# Patient Record
Sex: MALE | Race: WHITE | NOT HISPANIC OR LATINO | Employment: PART TIME | URBAN - METROPOLITAN AREA
[De-identification: names, ages, dates, MRNs, and addresses within clinical notes are randomized per-mention and may not be internally consistent; named-entity substitution may affect disease eponyms.]

---

## 2021-07-30 ENCOUNTER — TRANSFERRED RECORDS (OUTPATIENT)
Dept: HEALTH INFORMATION MANAGEMENT | Facility: CLINIC | Age: 26
End: 2021-07-30

## 2021-07-30 ENCOUNTER — TELEPHONE (OUTPATIENT)
Dept: BEHAVIORAL HEALTH | Facility: CLINIC | Age: 26
End: 2021-07-30

## 2021-07-30 LAB
ALT SERPL-CCNC: 30 IU/L (ref 6–40)
AST SERPL-CCNC: 20 IU/L (ref 10–40)
CREATININE (EXTERNAL): 1 MG/DL (ref 0.7–1.2)
GFR ESTIMATED (EXTERNAL): >60 ML/MIN/1.73M2
GLUCOSE (EXTERNAL): 115 MG/DL (ref 70–99)
POTASSIUM (EXTERNAL): 3.5 MEQ/L (ref 3.4–5.1)

## 2021-07-31 ENCOUNTER — HOSPITAL ENCOUNTER (INPATIENT)
Facility: HOSPITAL | Age: 26
LOS: 3 days | Discharge: HOME | End: 2021-08-03
Attending: PSYCHIATRY & NEUROLOGY | Admitting: PSYCHIATRY & NEUROLOGY
Payer: MEDICAID

## 2021-07-31 DIAGNOSIS — F10.20 ALCOHOL USE DISORDER, MODERATE, IN CONTROLLED ENVIRONMENT (H): Primary | ICD-10-CM

## 2021-07-31 PROBLEM — R45.851 SUICIDAL IDEATION: Status: ACTIVE | Noted: 2021-07-31

## 2021-07-31 PROCEDURE — 124N000001 HC R&B MH

## 2021-07-31 PROCEDURE — 250N000013 HC RX MED GY IP 250 OP 250 PS 637: Performed by: NURSE PRACTITIONER

## 2021-07-31 PROCEDURE — 99223 1ST HOSP IP/OBS HIGH 75: CPT | Performed by: NURSE PRACTITIONER

## 2021-07-31 RX ORDER — TRAZODONE HYDROCHLORIDE 50 MG/1
50 TABLET, FILM COATED ORAL
Status: DISCONTINUED | OUTPATIENT
Start: 2021-07-31 | End: 2021-08-03 | Stop reason: HOSPADM

## 2021-07-31 RX ORDER — NALTREXONE HYDROCHLORIDE 50 MG/1
50 TABLET, FILM COATED ORAL DAILY
Status: DISCONTINUED | OUTPATIENT
Start: 2021-08-02 | End: 2021-08-03 | Stop reason: HOSPADM

## 2021-07-31 RX ORDER — IBUPROFEN 200 MG
200-400 TABLET ORAL EVERY 6 HOURS PRN
Status: DISCONTINUED | OUTPATIENT
Start: 2021-07-31 | End: 2021-08-03 | Stop reason: HOSPADM

## 2021-07-31 RX ORDER — DIAZEPAM 5 MG
10 TABLET ORAL EVERY 30 MIN PRN
Status: DISCONTINUED | OUTPATIENT
Start: 2021-07-31 | End: 2021-08-02

## 2021-07-31 RX ORDER — CLONIDINE HYDROCHLORIDE 0.1 MG/1
0.1 TABLET ORAL 2 TIMES DAILY PRN
Status: DISCONTINUED | OUTPATIENT
Start: 2021-07-31 | End: 2021-08-02

## 2021-07-31 RX ORDER — MAGNESIUM HYDROXIDE/ALUMINUM HYDROXICE/SIMETHICONE 120; 1200; 1200 MG/30ML; MG/30ML; MG/30ML
30 SUSPENSION ORAL EVERY 4 HOURS PRN
Status: DISCONTINUED | OUTPATIENT
Start: 2021-07-31 | End: 2021-08-03 | Stop reason: HOSPADM

## 2021-07-31 RX ORDER — HYDROXYZINE HYDROCHLORIDE 25 MG/1
25-50 TABLET, FILM COATED ORAL EVERY 4 HOURS PRN
Status: DISCONTINUED | OUTPATIENT
Start: 2021-07-31 | End: 2021-08-03 | Stop reason: HOSPADM

## 2021-07-31 RX ORDER — OLANZAPINE 5 MG/1
5-10 TABLET ORAL 3 TIMES DAILY PRN
Status: DISCONTINUED | OUTPATIENT
Start: 2021-07-31 | End: 2021-08-03 | Stop reason: HOSPADM

## 2021-07-31 RX ORDER — ACETAMINOPHEN 325 MG/1
650 TABLET ORAL EVERY 4 HOURS PRN
Status: DISCONTINUED | OUTPATIENT
Start: 2021-07-31 | End: 2021-08-03 | Stop reason: HOSPADM

## 2021-07-31 RX ADMIN — NALTREXONE HYDROCHLORIDE 25 MG: 50 TABLET, FILM COATED ORAL at 12:50

## 2021-07-31 RX ADMIN — CLONIDINE HYDROCHLORIDE 0.1 MG: 0.1 TABLET ORAL at 02:39

## 2021-07-31 ASSESSMENT — ACTIVITIES OF DAILY LIVING (ADL)
CONCENTRATING,_REMEMBERING_OR_MAKING_DECISIONS_DIFFICULTY: YES
FALL_HISTORY_WITHIN_LAST_SIX_MONTHS: NO
DIFFICULTY_EATING/SWALLOWING: NO
VISION_MANAGEMENT: USE OF GLASSES
ORAL_HYGIENE: INDEPENDENT
TOILETING_ISSUES: NO
HEARING_DIFFICULTY_OR_DEAF: NO
DOING_ERRANDS_INDEPENDENTLY_DIFFICULTY: NO
DRESS: SCRUBS (BEHAVIORAL HEALTH)
ORAL_HYGIENE: INDEPENDENT
DRESSING/BATHING_DIFFICULTY: NO
HEARING_DIFFICULTY_OR_DEAF: NO
PATIENT_/_FAMILY_COMMUNICATION_STYLE: SPOKEN LANGUAGE (ENGLISH OR BILINGUAL)
ADL_ASSESSMENT: WDL
DIFFICULTY_EATING/SWALLOWING: NO
WALKING_OR_CLIMBING_STAIRS_DIFFICULTY: NO
TOILETING_ISSUES: NO
HYGIENE/GROOMING: INDEPENDENT
CONCENTRATING,_REMEMBERING_OR_MAKING_DECISIONS_DIFFICULTY: YES
HYGIENE/GROOMING: INDEPENDENT
LAUNDRY: UNABLE TO COMPLETE
DIFFICULTY_COMMUNICATING: NO
WEAR_GLASSES_OR_BLIND: YES
DRESSING/BATHING_DIFFICULTY: NO
WEAR_GLASSES_OR_BLIND: YES
WALKING_OR_CLIMBING_STAIRS_DIFFICULTY: NO

## 2021-07-31 ASSESSMENT — LIFESTYLE VARIABLES
HOW OFTEN DO YOU HAVE A DRINK CONTAINING ALCOHOL: PATIENT DECLINED
HOW MANY STANDARD DRINKS CONTAINING ALCOHOL DO YOU HAVE ON A TYPICAL DAY: PATIENT DECLINED
HOW OFTEN DO YOU HAVE SIX OR MORE DRINKS ON ONE OCCASION: PATIENT DECLINED

## 2021-07-31 ASSESSMENT — MIFFLIN-ST. JEOR: SCORE: 1797.76

## 2021-07-31 NOTE — TELEPHONE ENCOUNTER
S: Thais [phone], [facility] ED, 26/M, SI     B: Hx of dep   Pt reports active SI   Pt reports attempting an overdose with alcohol use   Family reports the pt MH has been declining recently     Medically cleared, eating, drinking, ambulating indep  Patient cleared and ready for behavioral bed placement: Yes   covid neg  UDS pos for opiates    A: Voluntary     R: HI/Chase     719pm - Intake left VM for Anamika, on call provider   728pm - Anamika accepts, would like to know an ETA on when the ED can get transportation set up due to hx of transport issues w this ED   Pt placed in queue   Clinical faxed to the unit   733pm - unit charge notified, 745pm for report   743pm - ED notified

## 2021-07-31 NOTE — PLAN OF CARE
Problem: Behavioral Health Plan of Care  Goal: Patient-Specific Goal (Individualization)  Description: Pt will be complaint with treatment team recommendation.   Pt will sleep 6-8 hours per night.   Pt will eat 50 % of meals.   Pt will attend group     Outcome: Improving     Problem: Suicidal Behavior  Goal: Suicidal Behavior is Absent or Managed  Outcome: Improving   Face to face shift report received from sandra michael.  Rounding completed, pt observed.  Pt pleasant, polite, cooperative.  Full affect, good eye contact.  Denies SI or any plan to harm himself.  Denies pain and CIWA scores all zero.    Pt states he is very tired.  Has been sleeping for most of the shift.  Did come out to open unit for dinner and snack. When on the unit for nourishment, interaction with peers was appropriate.   Encouraged pt to let staff know of any needs and he agreed.    Face to face report will be communicated to oncoming RN.    Tracie Whittaker RN  7/31/2021  9:20 PM

## 2021-07-31 NOTE — PROGRESS NOTES
07/31/21 0152   Patient Belongings   Did you bring any home meds/supplements to the hospital?  No   Patient Belongings locker;sent to security per site process   Patient Belongings Put in Hospital Secure Location (Security or Locker, etc.) watch;shoes;clothing   Belongings Search Yes   Clothing Search Yes   Second Staff Lissy   Comment water pitcher, boxers, tan cargo shorts, black nike shoes, blue tshirt, socks, headphones, 2 lighters, cigarettes, face mask, belt   List items sent to safe:  Smart watch-blue band.    All other belongings put in assigned cubby in belongings room.     I have reviewed my belongings list on admission and verify that it is correct.     Patient signature_______________________________    Second staff witness (if patient unable to sign) ______________________________       I have received all my belongings at discharge.    Patient signature________________________________    Jeanna  7/31/2021  1:54 AM

## 2021-07-31 NOTE — PLAN OF CARE
"    ADMISSION NOTE    Reason for admission suicidal ideation with possible suicide attempt per ED notes.  Safety concerns fall risk secondary to intoxification.  Risk for or history of violence none known.   Full skin assessment: intact with the exception of his right antecubital area will likely bruise (lab draw), and left top hand (IV removed) left, antecubital and forearm (lab) .     Patient arrived on unit from [facility]  accompanied by security  on 7/31/2021  0130 AM.   Status on arrival: ambulating independently.   /84   Pulse 75   Temp (!) 96.2  F (35.7  C) (Temporal)   Resp 18   Ht 1.854 m (6' 1\")   Wt 75.4 kg (166 lb 3.2 oz)   SpO2 95%   BMI 21.93 kg/m    Patient given tour of unit and Welcome to  unit papers given to patient, wanding completed, belongings inventoried, and admission assessment completed.   Patient's legal status on arrival is volentary. Appropriate legal rights discussed with and copy given to patient. Patient Bill of Rights discussed with and copy given to patient.   Patient denies SI, HI, and thoughts of self harm and contracts for safety while on unit.      Talisha Mccain RN  7/31/2021      Patient states he is going on his second DWI and works at [employer] . Unclear if he drives for them or not. He presents highly confused and possibly still intoxicated. States he never was suicidal and remembers possibly drinking Alfred Fitch. States he has been sad and worried about his upcoming court for his second DWI. States he is scared his family support will be lost. Unclear if the patient is currently intoxicated, or has an impaired cognition generally. He states confusion to what brought him here, who his primary care doctor is, if he takes medications daily, what he does for a living. Patient requires repeated reminders and cues to lay down, (after he states he wants to) that he has a blanket available, which direction the nursing station is " in. Statement from nursing report that patients mother thinks he is on the spectrum. Patient states he graduated high school and attended college for Law Enforcement. Says he lost motivation to actually work in that field. Patient denies drinking frequently, but he is pending a second DWI charge per pt. DUSTIN for  family support persons signed and listed in the patients chart.
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
## 2021-07-31 NOTE — PLAN OF CARE
"  Problem: Behavioral Health Plan of Care  Goal: Patient-Specific Goal (Individualization)  Description: Pt will be complaint with treatment team recommendation.   Pt will sleep 6-8 hours per night.   Pt will eat 50 % of meals.   Pt will attend group     7/31/2021 1216 by Kyra Kim, RN  Outcome: Improving  Note:        Problem: Suicidal Behavior  Goal: Suicidal Behavior is Absent or Managed  Outcome: Improving   Pt. Awake, alert and cooperative with assessments and cares, compliant with treatment team recommendations, slept 3 hours last night, plus has been napping this morning, eating lunch at this time, has not attended group therapy yet this shift, denies suicidal ideation, states \"I've never been suicidal\", denies depression, anxiety, hallucinations, and any alcohol withdrawal symptoms, has scored 0 on the CIWA scale so far this shift, cooperative and polite with assessment questions, tearful at one point during interview, stating \"I don't want to drink anymore\", responses are slightly delayed during conversation, will continue to monitor.  1300-  NP placed pt. On a hold, hold paper and 72 hour hold rights given to pt.  1430-  Pt. Attended partial group, left and went back to bed.      Face to face end of shift report will be communicated to oncoming afternoon shift RN.     Kyra Kim RN  7/31/2021  12:35 PM        " [General Appearance - Well Developed] : well developed [General Appearance - Well Nourished] : well nourished [Normal Appearance] : normal appearance [Well Groomed] : well groomed [General Appearance - In No Acute Distress] : no acute distress [Abdomen Soft] : soft [Scrotum] : the scrotum was normal [Urethral Meatus] : meatus normal [Urinary Bladder Findings] : the bladder was normal on palpation [Testes Mass (___cm)] : there were no testicular masses [Skin Turgor] : supple [Affect] : the affect was normal [Not Anxious] : not anxious [Oriented To Time, Place, And Person] : oriented to person, place, and time [Mood] : the mood was normal [Normal Station and Gait] : the gait and station were normal for the patient's age

## 2021-08-01 LAB — TSH SERPL DL<=0.005 MIU/L-ACNC: 1.1 MU/L (ref 0.4–4)

## 2021-08-01 PROCEDURE — 250N000013 HC RX MED GY IP 250 OP 250 PS 637: Performed by: NURSE PRACTITIONER

## 2021-08-01 PROCEDURE — 36415 COLL VENOUS BLD VENIPUNCTURE: CPT | Performed by: NURSE PRACTITIONER

## 2021-08-01 PROCEDURE — 124N000001 HC R&B MH

## 2021-08-01 PROCEDURE — 82306 VITAMIN D 25 HYDROXY: CPT | Performed by: NURSE PRACTITIONER

## 2021-08-01 PROCEDURE — 84443 ASSAY THYROID STIM HORMONE: CPT | Performed by: NURSE PRACTITIONER

## 2021-08-01 PROCEDURE — 99231 SBSQ HOSP IP/OBS SF/LOW 25: CPT | Performed by: NURSE PRACTITIONER

## 2021-08-01 RX ADMIN — NALTREXONE HYDROCHLORIDE 25 MG: 50 TABLET, FILM COATED ORAL at 08:20

## 2021-08-01 ASSESSMENT — ACTIVITIES OF DAILY LIVING (ADL)
ORAL_HYGIENE: INDEPENDENT
ORAL_HYGIENE: INDEPENDENT
LAUNDRY: UNABLE TO COMPLETE
HYGIENE/GROOMING: INDEPENDENT
HYGIENE/GROOMING: INDEPENDENT
DRESS: SCRUBS (BEHAVIORAL HEALTH)
DRESS: SCRUBS (BEHAVIORAL HEALTH)

## 2021-08-01 NOTE — PLAN OF CARE
Problem: Behavioral Health Plan of Care  Goal: Patient-Specific Goal (Individualization)  Description: Pt will be complaint with treatment team recommendation.   Pt will sleep 6-8 hours per night.   Pt will eat 50 % of meals.   Pt will attend group     Outcome: Improving  Note:        Problem: Suicidal Behavior  Goal: Suicidal Behavior is Absent or Managed  Outcome: Improving     Problem: Alcohol Withdrawal  Goal: Alcohol Withdrawal Symptom Control  Outcome: Improving   Pt. Was up and ate breakfast in dayroom, appetite is good, went back to bed after eating, slept 7 hours last night, denies suicidal ideation, depression and anxiety, denies hallucinations and pain, scored a 0 on the CIWA scale, not showing any symptoms of alcohol withdrawal, cooperative with cares, encouraged to attend group therapy sessions, will continue to monitor.    Face to face end of shift report will be communicated to oncoming afternoon shift RN.     Kyra Kim RN  8/1/2021  10:40 AM

## 2021-08-01 NOTE — PROGRESS NOTES
"[facility]  Psychiatric Progress Note     Impression     Barney Umana is a 26 year old  male who was brought to the [facility] emergency room by \" I think my grandpa\".  He does not recall a lot of the events that took place the day of his admission due to his alcohol intoxication.  His mother had reported to the emergency room that his depression had been increasing lately and believes that an upcoming court date has increased his depression even more.  His family reported they believed he had possibly overdosed and was using alcohol as a means to end his life.  When he was assessed in the emergency room he did state that he was having suicidal ideation.      I found Barney bedresting in his room. He declined to discuss any mental health issues with me at this time. Will try to meet with patient again tomorrow.       Educated regarding medication indications, risks, benefits, side effects, contraindications and possible interactions. Verbally expressed understanding.      Diagnoses     Adjustment disorder with depression and anxiety  Rule out major depressive disorder, mild  Alcohol use disorder moderate  Pervasive developmental disorder, diagnosed age 7    Attestation:  Patient has been seen and evaluated by me,  MARCELA Benjamin CNP       Medications     I have reviewed this patient's current medications  Current Facility-Administered Medications Ordered in Epic   Medication Dose Route Frequency Last Rate Last Admin     acetaminophen (TYLENOL) tablet 650 mg  650 mg Oral Q4H PRN         alum & mag hydroxide-simethicone (MAALOX) suspension 30 mL  30 mL Oral Q4H PRN         cloNIDine (CATAPRES) tablet 0.1 mg  0.1 mg Oral BID PRN   0.1 mg at 07/31/21 0239     diazepam (VALIUM) tablet 10 mg  10 mg Oral Q30 Min PRN         hydrOXYzine (ATARAX) tablet 25-50 mg  25-50 mg Oral Q4H PRN         ibuprofen (ADVIL/MOTRIN) tablet 200-400 mg  200-400 mg Oral Q6H PRN         melatonin 3 mg (with vit " "B6 10 mg) extended release tablet 1-2 tablet  1-2 tablet Oral At Bedtime PRN         [START ON 8/2/2021] naltrexone (DEPADE/REVIA) tablet 50 mg  50 mg Oral Daily         nicotine (NICORETTE) gum 2-4 mg  2-4 mg Buccal Q1H PRN         OLANZapine (zyPREXA) tablet 5-10 mg  5-10 mg Oral TID PRN         traZODone (DESYREL) tablet 50 mg  50 mg Oral At Bedtime PRN         No current Epic-ordered outpatient medications on file.        10 point ROS - denies new concerns     Allergies     No Known Allergies     Psychiatric Examination     /63   Pulse 80   Temp 97.1  F (36.2  C) (Temporal)   Resp 14   Ht 1.854 m (6' 1\")   Wt 75.4 kg (166 lb 3.2 oz)   SpO2 99%   BMI 21.93 kg/m    Weight is 166 lbs 3.2 oz  Body mass index is 21.93 kg/m .    -Appearance/Behavior: bedresting  -Motor: unable to assess, patient sleeping  -Gait:  unable to assess, patient sleeping  -Abnormal involuntary movements:  unable to assess, patient sleeping  -Mood:  unable to assess, patient sleeping  -Affect:  unable to assess, patient sleeping  -Speech:  unable to assess, patient sleeping       -Thought process/associations: unable to assess, patient sleeping  -Thought content:  unable to assess, patient sleeping  -Perceptual disturbances:  unable to assess, patient sleeping    -Suicidal/Homicidal Ideation:  unable to assess, patient sleeping  -Judgment:  unable to assess, patient sleeping  -Insight:  unable to assess, patient sleeping  *Orientation:  unable to assess, patient sleeping  *Memory:  unable to assess, patient sleeping  *Attention:  unable to assess, patient sleeping  *Language:  unable to assess, patient sleeping  *Fund of information:  unable to assess, patient sleeping  *Cognitive functioning estimate:  unable to assess, patient sleeping       Labs     No results found for this or any previous visit (from the past 24 hour(s)).        Plan/Treatment Team     BEHAVIORAL TEAM DISCUSSION    Progress: minimal, has spent the majority " of his time here sleeping    Continued Stay Criteria/Rationale: Patient was placed on a 72-hour hold yesterday    Medical/Physical: nothing acute     Precautions:     Falls precaution?: No    Behavioral Orders   Procedures     Code 1 - Restrict to Unit     Routine Programming     As clinically indicated     Status 15     Every 15 minutes.       Plan:  -Continue naltrexone 50 mg daily  -Ordered Vitamin D serum level and TSH with reflex to T4 to assess for any physiological issues that could be contributing to his depression    Rationale for change in precautions or plan: routine blood monitoring    Participants: MARCELA Benjamin CNP, Nursing    The patient's care was discussed with the treatment team and chart notes were reviewed.

## 2021-08-01 NOTE — PLAN OF CARE
Patient lying in bed at the start of shift. Appears to be sleeping.     Patient appeared to be sleeping  7 hours this shift.   Face to face end of shift report communicated to oncoming RN.     Talisha Mccain RN  7/31/2021

## 2021-08-01 NOTE — PLAN OF CARE
Problem: Behavioral Health Plan of Care  Goal: Patient-Specific Goal (Individualization)  Description: Pt will be complaint with treatment team recommendation.   Pt will sleep 6-8 hours per night.   Pt will eat 50 % of meals.   Pt will attend group     Outcome: Improving     Problem: Suicidal Behavior  Goal: Suicidal Behavior is Absent or Managed  Outcome: Improving     Problem: Alcohol Withdrawal  Goal: Alcohol Withdrawal Symptom Control  Outcome: Improving   Face to face shift report received from sandra Rae. Rounding completed, pt observed. Pt is pleasant, calm, cooperative, has full affect.   Pt slept most of the evening.  Did come out to open unit for meals.  Interacts appropriately with peers.  Attended group.  Denies SI or any plans for self harm.  Denies pain.  Pt did have questions about the next few days regarding discharge and his court appointment.     Face to face report will be communicated to oncoming RN.    Tracie Whittaker RN  8/1/2021  9:59 PM

## 2021-08-02 ENCOUNTER — APPOINTMENT (OUTPATIENT)
Dept: OCCUPATIONAL THERAPY | Facility: HOSPITAL | Age: 26
End: 2021-08-02
Attending: NURSE PRACTITIONER
Payer: MEDICAID

## 2021-08-02 LAB — DEPRECATED CALCIDIOL+CALCIFEROL SERPL-MC: 23 UG/L (ref 20–75)

## 2021-08-02 PROCEDURE — 97165 OT EVAL LOW COMPLEX 30 MIN: CPT | Mod: GO

## 2021-08-02 PROCEDURE — 99232 SBSQ HOSP IP/OBS MODERATE 35: CPT | Performed by: NURSE PRACTITIONER

## 2021-08-02 PROCEDURE — 124N000001 HC R&B MH

## 2021-08-02 PROCEDURE — 250N000013 HC RX MED GY IP 250 OP 250 PS 637: Performed by: NURSE PRACTITIONER

## 2021-08-02 RX ORDER — NALTREXONE HYDROCHLORIDE 50 MG/1
50 TABLET, FILM COATED ORAL DAILY
Qty: 30 TABLET | Refills: 0 | Status: SHIPPED | OUTPATIENT
Start: 2021-08-03

## 2021-08-02 RX ADMIN — NALTREXONE HYDROCHLORIDE 50 MG: 50 TABLET, FILM COATED ORAL at 08:14
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
## 2021-08-02 NOTE — PROGRESS NOTES
"[facility]  Psychiatric Progress Note      Impression:     Sleeping well. Is much brighter today than he was when I last saw him. He has been speaking to his grandmother and mother. He is very happy his family is being supportive of him. He feels very relieved. [facility] [place] and is able to have his rule 25 tomorrow at 1. His grandmother is picking him up at 9:30. He has court on Wednesday. He continues to deny any depression. Affect is brighter. Speech is more fluent. Does state that he was \"really hung over\"     Educated regarding medication indications, risks, benefits, side effects, contraindications and possible interactions. Verbally expressed understanding.        Diagnoses:        Adjustment disorder with depression and anxiety  Rule out major depressive disorder, mild  Alcohol use disorder moderate  Pervasive developmental disorder, diagnosed age 7          Attestation:  Patient has been seen and evaluated by me,  Akila Olivo NP          Interim History:   The patient's care was discussed with the treatment team and chart notes were reviewed.            Medications:       Prescription Medications as of 8/2/2021       Rx Number Disp Refills Start End Last Dispensed Date Next Fill Date Owning Pharmacy    naltrexone (DEPADE/REVIA) 50 MG tablet  30 tablet 0 8/3/2021    [facility], [place] - [address]    Sig: Take 1 tablet (50 mg) by mouth daily    Class: E-Prescribe    Route: Oral    nicotine (NICORETTE) 2 MG gum  220 each 0 8/2/2021    [facility], [place] - [address]    Sig: Place 1-2 each (2-4 mg) inside cheek every hour as needed for other (nicotine withdrawal symptoms)    Class: E-Prescribe    Route: [facility]      Hospital Medications as of 8/2/2021       Dose Frequency Start End    acetaminophen (TYLENOL) tablet 650 mg 650 mg EVERY 4 HOURS PRN 7/31/2021     Admin Instructions: Do not use if the patient has significant " liver disease. MAX acetaminophen = 4000 mg/24 hrs.  MAX acetaminophen LESS than 3000 mg/24 hrs for patients GREATER than or EQUAL to 65 years old.<BR>Maximum acetaminophen dose from all sources = 75 mg/kg/day not to exceed 4 grams/day.    Class: E-Prescribe    Route: Oral    alum & mag hydroxide-simethicone (MAALOX) suspension 30 mL 30 mL EVERY 4 HOURS PRN 7/31/2021     Admin Instructions: Shake well.    Class: E-Prescribe    Route: Oral    hydrOXYzine (ATARAX) tablet 25-50 mg 25-50 mg EVERY 4 HOURS PRN 7/31/2021     Admin Instructions: Administer only if there is no other oral medication ordered prn for anxiety. Consider discontinuing if another PRN is ordered for anxiety.    Class: E-Prescribe    Route: Oral    ibuprofen (ADVIL/MOTRIN) tablet 200-400 mg 200-400 mg EVERY 6 HOURS PRN 7/31/2021     Admin Instructions: Give with food.    Class: E-Prescribe    Route: Oral    melatonin 3 mg (with vit B6 10 mg) extended release tablet 1-2 tablet 1-2 tablet AT BEDTIME PRN 7/31/2021     Class: E-Prescribe    Route: Oral    naltrexone (DEPADE/REVIA) tablet 50 mg 50 mg DAILY 8/2/2021     Class: E-Prescribe    Route: Oral    nicotine (NICORETTE) gum 2-4 mg 2-4 mg EVERY 1 HOUR PRN 7/31/2021     Admin Instructions: Chew until tingling, then place between cheek and gum.  <BR>Repeat.  <BR>Do not swallow.  <BR>Not to exceed 48 mg in a 24 hour time period.    Class: E-Prescribe    Route: Buccal    OLANZapine (zyPREXA) tablet 5-10 mg 5-10 mg 3 TIMES DAILY PRN 7/31/2021     Admin Instructions: Combined IM and PO doses may significantly increase the risk of orthostatic hypotension at 30 mg per day or higher.    Class: E-Prescribe    Route: Oral    traZODone (DESYREL) tablet 50 mg 50 mg AT BEDTIME PRN 7/31/2021     Class: E-Prescribe    Route: Oral               10 point ROS negative        Allergies:   No Known Allergies         Psychiatric Examination:   /70   Pulse 65   Temp 97.3  F (36.3  C) (Tympanic)   Resp 14   Ht  "1.854 m (6' 1\")   Wt 75.4 kg (166 lb 3.2 oz)   SpO2 97%   BMI 21.93 kg/m    Weight is 166 lbs 3.2 oz  Body mass index is 21.93 kg/m .    MSE/PSYCH  PSYCHIATRIC EXAM  /70   Pulse 65   Temp 97.3  F (36.3  C) (Tympanic)   Resp 14   Ht 1.854 m (6' 1\")   Wt 75.4 kg (166 lb 3.2 oz)   SpO2 97%   BMI 21.93 kg/m    -Appearance/Behavior: improved. Slightly unkempt  -Motor: intact  -Gait: intact  -Abnormal involuntary movements: none  -Mood: improved  -Affect: brighter  -Speech: regular though monotone  -Thought process/associations: Logical and Goal directed.  -Thought content: no delusions or hallucinations  -Perceptual disturbances: No hallucinations..              -Suicidal/Homicidal Ideation: passive suicidal thoughts.  -Judgment: improved  -Insight: improved  *Orientation: time, place and person.  *Memory: intact  *Attention: fair  *Language: fluent, no aphasias, able to repeat phrases and name objects. Vocab intact.  *Fund of information: appropriate for education  *Cognitive functioning estimate: 0 - independent.                 Labs:     Results for orders placed or performed during the hospital encounter of 07/31/21   TSH with free T4 reflex     Status: Normal   Result Value Ref Range    TSH 1.10 0.40 - 4.00 mU/L                Plan/Treatment Team     BEHAVIORAL TEAM DISCUSSION    Progress: improved. Brighter.     Continued Stay Criteria/Rationale: monitoring for withdrawal and depression    Medical/Physical: none    Precautions: none    Falls precaution?: No  Behavioral Orders   Procedures     Code 1 - Restrict to Unit     Routine Programming     As clinically indicated     Status 15     Every 15 minutes.                     Plan for this encounter/Med Changes/Labs:       Continue naltrexone. Sent to [facility] for discharge. Unit to cover due to pending insurance    Will have rule 25 completed at the [facility] tomorrow.               Participants: April Pallavi NP,  nursing, Social work, OT          ARELY  " tomorrow

## 2021-08-02 NOTE — PLAN OF CARE
"SHIFT SUMMARY:  Denies SI/HI, AH/VH.  Reports discomfort in his left ankle R/T prior surgery on his foot - requested his \"boot\", but did not need PRN pain management. Requests a covid vaccination. Calm and cooperative. CIWAs were discontinued per order. In the lounge, socializing with peers. Report given to oncoming nurse.      Problem: Behavioral Health Plan of Care  Goal: Patient-Specific Goal (Individualization)  Description: Pt will be complaint with treatment team recommendation.   Pt will sleep 6-8 hours per night.   Pt will eat 50 % of meals.   Pt will attend group     Outcome: Improving  Goal: Absence of New-Onset Illness or Injury  Outcome: Improving     Problem: Suicidal Behavior  Goal: Suicidal Behavior is Absent or Managed  Outcome: Improving     Problem: Alcohol Withdrawal  Goal: Alcohol Withdrawal Symptom Control  Outcome: Improving     Problem: Acute Neurologic Deterioration (Alcohol Withdrawal)  Goal: Optimal Neurologic Function  Outcome: Improving     "

## 2021-08-02 NOTE — PLAN OF CARE
Face to face shift report received from evening RN. Rounding completed, pt observed to be lying in bed, eyes closed, respirations observed.    Patient appeared to have been sleeping 7 hours this shift.     Face to face end of shift report communicated to oncoming RN.     Talisha Mccain RN on 8/1/2021

## 2021-08-02 NOTE — PROGRESS NOTES
Behavioral Health Occupational Therapy Eval      Name: Barney Umana MRN# [id number]   Age: 26 year old YOB: 1995     Date of Consultation: 2021  Primary care provider: Christine Christensen MD    Referring Physician: Akila Olivo  Orders: Eval and Treat  Medical Diagnosis: adjustment disorder with depression and anxiety, alcohol use disorder, pervasive developmental disorder  Onset of Illness/Injury: 21    Prior Level of Function: Pt was admitted due to reports from family that his depression has been increasing.  Pt was intoxicated with ETOH upon admission and does not recall many of the events that lead to his arrival here.  Stressor includes getting a recent DUI leading to job loss and inability to drive.  The pandemic has worsened his drinking.  Lives with mom and step dad.  Does make simple meals at times like frozen pizza, grills burgers or ramen noodles.  Not on any meds prior to admit.  Enjoys anything outside like hiking, biking and kayaking.  States that when he is bored he is triggered to drink and his MH symptoms increase.  Has a .  Is interested in going to AA meetings and or CD treatment in [place].  Has never lived on his own before.  Plan to discharge home tomorrow so he can go to his court hearing on Wednesday for DUI.      Current Level of Function: Pt has been alert and social on the unit.  Attending groups and appropriate with peers.  Completed the MOCA and pt scored 20/30 indicating below normal cognition as score of 26 or above indicates normal cognition.    MOCA version 7.1  Visuospatial/ Executive Functionin/5  Trail making (alternating letters and numbers):   Shape copy:   Clock drawin3  Naming: 3/3  Immediate Recall (not scored): 5/5  Patient accurately verbalized 5/5 non-related words  Attention:    Number repetition:   Number reversal:   Sustained attention: Patient identifying 11/11 targets with x5 false  positives  Serial subtraction: Patient completed serial 7 subtractions 3/5 calculations  Language: 2/3  Sentence repetition: 2/2 when asked to repeat a sentence word-for-word  Divergent naming: Patient named 10 items beginning with the letter /f/ within one minute  Abstract Thinkin/2  Similarities between 2 items (abstract thinking): 0/2  Delayed Recall: 0/5  Patient recalled 0/5 words after approx 5 minute delay without cues.  Orientation:   Patient oriented to date, month, year, day of the week, place and city.      TOTAL SCORE: 20/30      Patient/Family Goal: get sober    Fall Screen:   Have you fallen 2 or more times in the last year? No  Have you fallen and had an injury in the last year? No  Timed up & go: NA  Is patient a fall risk? No    Past Medical History:   No past medical history on file.    Past Surgical History:  No past surgical history on file.    Medications:   Current Facility-Administered Medications   Medication     acetaminophen (TYLENOL) tablet 650 mg     alum & mag hydroxide-simethicone (MAALOX) suspension 30 mL     hydrOXYzine (ATARAX) tablet 25-50 mg     ibuprofen (ADVIL/MOTRIN) tablet 200-400 mg     melatonin 3 mg (with vit B6 10 mg) extended release tablet 1-2 tablet     naltrexone (DEPADE/REVIA) tablet 50 mg     nicotine (NICORETTE) gum 2-4 mg     OLANZapine (zyPREXA) tablet 5-10 mg     traZODone (DESYREL) tablet 50 mg       Reason for OT Referral:  Mental Health History: Hx of DUI, no hx of CD treatment, sees a therapist in [place]  Signs/Symptoms of compliant: depression, ETOH intoxication  Aggravating factors/Current Life Stressors: DUI and loss of job and income, unable to drive  Current Services: therapy    Personal Information:  Family Structure: mom, grandma, brother  Living Arrangement: lives with mom   Finances: none  Medication Management: none   Support System:friends, family, therapy     Physical Presentation:   Mobility: no concerns  Strength/ROM: WF:    Comfort/Pain: none reported at this time  Sensory: no concerns       Cognition:  Orientation:  time, place and person  Memory: Intact  Safety awareness: Intact  Attention: Normal   Motivation: Normal   Judgement/Insight: Diminished  Speech/Language: Normal  Mood: Neutral  Affect: Appropriate  Thought Content: appropriate    Goals:   Pt will participate in cog testing to aide in safe discharge planning.      Planned Interventions: MOCA    Clinical Impressions:  Criteria for Skilled Therapeutic Intervention Met: eval only  OT Diagnosis: impaired cognition  Influenced by the following impairments: ETOH abuse, depression, pervasive developmental disorder  Functional limitations due to impairment: home management, driving, employment, emotion regulation  Clinical presentation: Stable/Uncomplicated  Clinical presentation rationale: score on MOCA  Clinical Decision making (complexity): Low Complexity  Predicted Duration of Therapy Intervention (days/wks): eval only  Risks and Benefits of therapy have been explained: Yes  Patient, Family & other staff in agreement with plan of care: Yes  Comments: Eval only, pt to discharge tomorrow so he can attend a court hearing for DUI on Wednesday.  Rule 25 to be set up outpatient.  Would likely benefit from assistance with finances, and med set up.  May also benefit from [facility] serivces.      Total Evaluation Time: 25    Discharge Planner OT   Patient plan for discharge: home with Rule 25 outpatient  Current status: MOCA completed, see above for scoring  Barriers to return to prior living situation: impaired cognition, high risk of relapse on ETOH  Recommendations for discharge: home with outpatient services. Would likely benefit from assistance with finances, and med set up.  May also benefit from [facility] serivces.    Rationale for recommendations: clinical judgement, score on MOCA       Entered by: Shannon Meeks 08/02/2021 3:13 PM        Occupational Therapy Discharge  Summary    Reason for therapy discharge:    All goals and outcomes met, no further needs identified.    Progress towards therapy goal(s). See goals on Care Plan in [facility] electronic [facility] record for goal details.  Goals met    Therapy recommendation(s):    No further therapy is recommended.  [facility] and assistance with meds and finances.

## 2021-08-02 NOTE — DISCHARGE INSTRUCTIONS
Behavioral Discharge Planning and Instructions    Summary: Patient was admitted with SI.     Main Diagnosis: Adjustment disorder with depression and anxiety  Rule out major depressive disorder, mild  Alcohol use disorder moderate  Pervasive developmental disorder, diagnosed age 7    Health Care Follow-up:     *Follow up not arranged as pt prefers to wait until he knows the outcome of court*    [facility]   Rule 25 update- Pt will schedule this on his own  [address]   [place] [address] [phone]    [facility]  PCP- Christine Christensen- As needed   [address] [address] [phone]    Attend all scheduled appointments with your outpatient providers. Call at least 24 hours in advance if you need to reschedule an appointment to ensure continued access to your outpatient providers.     Major Treatments, Procedures and Findings:  You were provided with: a psychiatric assessment, assessed for medical stability, medication evaluation and/or management, group therapy, family therapy, individual therapy, CD evaluation/assessment, milieu management and medical interventions    Symptoms to Report: feeling more aggressive, increased confusion, losing more sleep, mood getting worse or thoughts of suicide    Early warning signs can include: increased depression or anxiety sleep disturbances increased thoughts or behaviors of suicide or self-harm  increased unusual thinking, such as paranoia or hearing voices    Safety and Wellness:  Take all medicines as directed.  Make no changes unless your doctor suggests them.      Follow treatment recommendations.  Refrain from alcohol and non-prescribed drugs.  Ask your support system to help you reduce your access to items that could harm yourself or others. If there is a concern for safety, call 911.    Resources:   Crisis Intervention: 629.621.1924 or 768-724-4381 (TTY: 650.168.6303).  Call anytime for help.  National [place] on Mental  Illness (www.[place].luis.org): 707.464.9444 or 404-530-6997.  Alcoholics Anonymous (www.alcoholics-anonymous.org): Check your phone book for your local chapter.  Suicide Awareness Voices of Education (SAVE) (www.save.org): 667-504-EBGC (6457)  National Suicide Prevention Line (www.mentalhealthmn.org): 578-230-RJLM (6914)  Mental Health Consumer/Survivor Network of [place] (www.mhcsn.net): 652.962.5753 or 503-649-7103  Mental Health Association of [place] (www.mentalhealth.org): 882.433.7069 or 944-770-7248  Self- Management and Recovery Training., SMART-- Toll free: 399.909.7224  www.[facility].net    General Medication Instructions:   See your medication sheet(s) for instructions.   Take all medicines as directed.  Make no changes unless your doctor suggests them.   Go to all your doctor visits.  Be sure to have all your required lab tests. This way, your medicines can be refilled on time.  Do not use any drugs not prescribed by your doctor.  Avoid alcohol.    Advance Directives:   Scanned document on file with [facility] [employer]? No scanned doc  Is document scanned? Pt unable to confirm  Honoring Choices Your Rights Handout: Informed and given  Was more information offered? Pt declined    The Treatment team has appreciated the opportunity to work with you. If you have any questions or concerns about your recent admission, you can contact the unit which can receive your call 24 hours a day, 7 days a week. They will be able to get in touch with a Provider if needed. The unit number is [phone] .

## 2021-08-02 NOTE — PROGRESS NOTES
"[facility]  Psychiatric Progress Note     Impression     Barney has been taking the naltrexone.  He has been sleeping through the night and has not been having any symptoms of alcohol withdrawal.  He is now socializing a bit more with other patients on the unit and has attended a couple of groups.  His affect is still quite restricted and speech is monotone and a bit delayed but this appears to be very secondary to his autism spectrum disorder.  I did speak with his grandmother 2 days ago who he is very close with.  He does plan on moving in with them once he is discharged from here and his grandmother is aware of this.  His grandmother states that \"there is nobody at home ever because his parents both work and at least if he is here he can get meals\" I did tell his grandmother that I had some concern due to his autism spectrum and possible intellectual abilities, he may need more services in the community and it would be beneficial to have a neuropsychiatric/cognitive evaluation to see if the services are warranted.    Educated regarding medication indications, risks, benefits, side effects, contraindications and possible interactions. Verbally expressed understanding.      Diagnoses     Adjustment disorder with depression and anxiety  Rule out major depressive disorder, mild  Alcohol use disorder moderate  Pervasive developmental disorder, diagnosed age 7    Attestation:  Patient has been seen and evaluated by me,  Akila Olivo, BELLE       Medications     I have reviewed this patient's current medications  Current Facility-Administered Medications Ordered in Epic   Medication Dose Route Frequency Last Rate Last Admin     acetaminophen (TYLENOL) tablet 650 mg  650 mg Oral Q4H PRN         alum & mag hydroxide-simethicone (MAALOX) suspension 30 mL  30 mL Oral Q4H PRN         hydrOXYzine (ATARAX) tablet 25-50 mg  25-50 mg Oral Q4H PRN         ibuprofen (ADVIL/MOTRIN) tablet 200-400 mg  200-400 mg " "Oral Q6H PRN         melatonin 3 mg (with vit B6 10 mg) extended release tablet 1-2 tablet  1-2 tablet Oral At Bedtime PRN         naltrexone (DEPADE/REVIA) tablet 50 mg  50 mg Oral Daily   50 mg at 08/02/21 0814     nicotine (NICORETTE) gum 2-4 mg  2-4 mg Buccal Q1H PRN         OLANZapine (zyPREXA) tablet 5-10 mg  5-10 mg Oral TID PRN         traZODone (DESYREL) tablet 50 mg  50 mg Oral At Bedtime PRN         No current Epic-ordered outpatient medications on file.        10 point ROS - denies new concerns     Allergies     No Known Allergies     Psychiatric Examination     /50 (BP Location: Right arm)   Pulse 74   Temp 96.8  F (36  C) (Temporal)   Resp 16   Ht 1.854 m (6' 1\")   Wt 75.4 kg (166 lb 3.2 oz)   SpO2 99%   BMI 21.93 kg/m    Weight is 166 lbs 3.2 oz  Body mass index is 21.93 kg/m .    MSE/PSYCH  PSYCHIATRIC EXAM  /50 (BP Location: Right arm)   Pulse 74   Temp 96.8  F (36  C) (Temporal)   Resp 16   Ht 1.854 m (6' 1\")   Wt 75.4 kg (166 lb 3.2 oz)   SpO2 99%   BMI 21.93 kg/m    -Appearance/Behavior: flat apathetic  -Motor: intact  -Gait: intact  -Abnormal involuntary movements: none  -Mood: Restricted.  Likely secondary to autism spectrum  -Affect: Flat and restricted: Appears to be more secondary to spectrum  -Speech: regular though monotone, a bit delayed  -Thought process/associations: Logical and Goal directed.  Though concrete  -Thought content: no delusions or hallucinations  -Perceptual disturbances: No hallucinations..              -Suicidal/Homicidal Ideation: Denies any suicidal ideation  -Judgment: Limited though improving  -Insight: Improving  *Orientation: time, place and person.  *Memory: intact  *Attention: fair  *Language: fluent, no aphasias, able to repeat phrases and name objects. Vocab intact.  *Fund of information: Likely below average  *Cognitive functioning estimate: 0 - independent.           Labs     Recent Results (from the past 24 hour(s))   TSH with free " T4 reflex    Collection Time: 08/01/21  1:48 PM   Result Value Ref Range    TSH 1.10 0.40 - 4.00 mU/L           Plan/Treatment Team     BEHAVIORAL TEAM DISCUSSION    Progress: Improving.  Is more social on the unit.  Did attend a couple of groups.    Continued Stay Criteria/Rationale: Monitoring for alcohol withdrawal and suicidal ideation.  Will likely discharge tomorrow    Medical/Physical: nothing acute     Precautions: None.  Alcohol withdrawal discontinued.    Falls precaution?: No    Behavioral Orders   Procedures     Code 1 - Restrict to Unit     Routine Programming     As clinically indicated     Status 15     Every 15 minutes.       Plan:    Occupational Therapy evaluation ordered for concerns about his ADLs at home and financial difficulties with money management    Continue naltrexone 50 mg daily    Rationale for change in precautions or plan: routine blood monitoring    Participants: MARCELA Amin CNP, Nursing    The patient's care was discussed with the treatment team and chart notes were reviewed.

## 2021-08-03 VITALS
TEMPERATURE: 98 F | RESPIRATION RATE: 18 BRPM | HEART RATE: 79 BPM | SYSTOLIC BLOOD PRESSURE: 114 MMHG | WEIGHT: 166.2 LBS | BODY MASS INDEX: 22.03 KG/M2 | DIASTOLIC BLOOD PRESSURE: 66 MMHG | OXYGEN SATURATION: 98 % | HEIGHT: 73 IN

## 2021-08-03 PROCEDURE — 250N000013 HC RX MED GY IP 250 OP 250 PS 637: Performed by: NURSE PRACTITIONER

## 2021-08-03 PROCEDURE — 99239 HOSP IP/OBS DSCHRG MGMT >30: CPT | Performed by: NURSE PRACTITIONER

## 2021-08-03 RX ADMIN — NALTREXONE HYDROCHLORIDE 50 MG: 50 TABLET, FILM COATED ORAL at 08:12

## 2021-08-03 NOTE — PLAN OF CARE
Face to face end of shift report received from Hector AUGUSTINE RN. Rounding completed and patient observed in the lounge. He requested the covid vaccine. Was told we do not offer it here but was given resources about where he can easily obtain.     20:00 Update: Patient denied depression, anxiety, SI/HI and hallucinations. He endorsed a small amount of anxiety. He is pleasant and cooperative. He is clean and neatly dressed. Patient attends groups and interacts with peers using appropriate boundaries. He denied pain and denied needing any other PRNs. Patient was awake all shift.     Face to face end of shift report communicated to oncgraeme RN.         Problem: Behavioral Health Plan of Care  Goal: Patient-Specific Goal (Individualization)  Description: Pt will be complaint with treatment team recommendation.   Pt will sleep 6-8 hours per night.   Pt will eat 50 % of meals.   Pt will attend group     Outcome: Improving     Problem: Suicidal Behavior  Goal: Suicidal Behavior is Absent or Managed  Outcome: Improving

## 2021-08-03 NOTE — PLAN OF CARE
Problem: Behavioral Health Plan of Care  Goal: Patient-Specific Goal (Individualization)  Description: Pt will be complaint with treatment team recommendation.   Pt will sleep 6-8 hours per night.   Pt will eat 50 % of meals.   Pt will attend group     Outcome: Improving     Face to face shift report received from RN. Rounding completed, pt observed. Client rested in room for 7 hours with eyes closed and respirations noted.Face to face report will be communicated to oncoming RN.    Joel Walsh RN  8/3/2021  6:45 AM

## 2021-08-03 NOTE — PLAN OF CARE
Discharge Note    Patient Discharged to home on 8/3/2021 9:38 AM via [employer] accompanied by unit staff.     Patient informed of discharge instructions in AVS. patient verbalizes understanding and denies having any questions pertaining to AVS. Patient stable at time of discharge. Patient denies SI, HI, and thoughts of self harm at time of discharge. All personal belongings returned to patient. Discharge prescriptions retrieved from in-house [facility].     Hector López RN  8/3/2021  9:38 AM     Problem: Behavioral Health Plan of Care  Goal: Patient-Specific Goal (Individualization)  Description: Pt will be complaint with treatment team recommendation.   Pt will sleep 6-8 hours per night.   Pt will eat 50 % of meals.   Pt will attend group     Outcome: Improving  Goal: Absence of New-Onset Illness or Injury  Outcome: Improving     Problem: Suicidal Behavior  Goal: Suicidal Behavior is Absent or Managed  Outcome: Improving

## 2021-08-03 NOTE — DISCHARGE SUMMARY
"     Psychiatric Discharge Summary    Barney Umana MRN# [id number]   Age: 26 year old YOB: 1995     Date of Admission:  7/31/2021  Date of Discharge:  8/3/21  Admitting Physician:  Tulio Stone MD  Discharge Provider:  Akila Olivo NP          Event Leading to Hospitalization and Hospital Stay    Barney Umana is a 26 year old  male who was brought to the [facility] emergency room by \" I think my grandpa\".  He does not recall a lot of the events that took place the day of his admission due to his alcohol intoxication.  His mother had reported to the emergency room that his depression had been increasing lately and believes that an upcoming court date has increased his depression even more.  His family reported they believed he had possibly overdosed and was using alcohol as a means to end his life.  When he was assessed in the emergency room he did state that he was having suicidal ideation.         His grandmother called me this morning after I met with him and gave me information.  He had been staying at his mother and stepfather's house and his mother found an empty bottle of Alfred Fitch in his room.  When his grandparents try to contact him he denied that he had been drinking.  Later on that day he had left his house and he was found sitting in the woods very intoxicated and it appeared that he had been hiding so he could continue to drink alcohol as he is not allowed to do this in his mother's home.  He did apparently tell his mother that he wanted to die though he had no means or intent of doing so.  He states that he does not and he states that he does not recall saying wanted to die.  He states he has never had suicidal thoughts.  He does state that this DUI has been a very large stressor in his life though states it has not contributed to or caused any depression.  He states that he was sober for 2 years after his first DUI and he states it was the happiest he had " "been.  His grandmother also states that during this period of sobriety he was very happy and doing very well.  He had been up for a promotion at a job.  He was on no antidepressants at that time.  He states that when his brother moved back home during the pandemic he relapsed on alcohol.  He states his brother \"peer pressures me and it was hard to say no\".  He states that when he started drinking during the pandemic it increased progressively.  He is now having a difficult time limiting himself when he starts drinking.  He states he has never smoked marijuana or used any hard drugs though states \"one of my friends thought something on [employer] and he told me it was Robitussin and to take some of it\" he states that this occurred 3 days ago.  He states that he is never used an opiate but opiates were in his system upon admission.  He states \"that must of been what my friend had given me\".      It is unclear if he purchased some type of medication off of [employer] or some other online site or if it actually was his friend.  His grandmother does state that he uses [employer] for purchases.     Alcohol blood level was 123 upon admission.  Drug screen was positive for opiates.        Did try to assess for major depressive disorder, other mood disorders as well as social anxiety disorder and he denied symptoms of all of these.  He does believe that his depression is directly related to his alcohol use and states he was very happy to 2 years he was sober after his first DUI on no medications.  He does state that he is a very difficult time abstaining from alcohol once he starts drinking.  We discussed starting naltrexone and he was interested in trying this and is willing to.  His grandmother did sound please that he was willing to start taking a medication like this.  His family is quite concerned that even when he was not drinking he has a difficult time taking care of himself specifically activities of daily living like cleaning his " room and making meals.  He was in special education as a child and I am unsure what his IQ is and what his struggles are that could prevent him from living independent.  It would be good to have a occupational therapy assessment  to see what these struggles could be and if there are any possible referrals we could send to the community that could help him.        Past Psychiatric History:         He just started seeing a therapist in [place].  He is on sure what the therapist name is.  He is only met with her twice he states that his mother set him up with this therapist.  He has never been to chemical dependency treatment.  He had a rule 25 after his second DUI though his grandmother states she was not sure what the results of the rule 25 were.  He has court this Wednesday.  His grandmother states she plans on calling his  on Monday to tell his  about this situation.  I did tell his grandmother that I would like him to have an updated rule 25 while he is here on our unit and have referrals sent for treatment.            Stay:  Admitted to unit  5 [facility]Provided a safe environment and therapeutic milieu. Encouraged participation in unit activities.         After the first day and some sleep and fluids his affect and moods brightened significnalty. He was quite delayed upon admission though likely secondary to amount of alcohol consumed the prior day. He also has autism spectrum with some possibly developmental delay. He denied dperession through his stay and had no suicdial thoughts. He was very relieved to have support from his family and that they were not shaming of him relapsing. He has court this coming wednesdya for his second dui and he is now future oriented regarding this situation and sees that his future is not ruined by this and that if he is able to abstain from alcohol he will have ability to have position as a  in the future after a period of sobreity. He was started on  naltrexone with no side effects. He plans on taking this regularly and also would like to enter teen challenge treatment. He has a rule 25 in [place] tomorrow.       Treatment Team Progress Note:   The patient's care was discussed with the treatment team and chart notes were reviewed.    Plan:  Patient is discharging at the recommendation of the treatment team.     Our team has made contact with his grandmother to ensure readiness for discharge.     At time of discharge, there is no evidence that patient is in immediate danger of self or others.        Diagnoses:       Adjustment disorder with depression and anxiety  Rule out major depressive disorder, mild  Alcohol use disorder moderate  Pervasive developmental disorder, diagnosed age 7          Labs:     Results for orders placed or performed during the hospital encounter of 07/31/21   TSH with free T4 reflex     Status: Normal   Result Value Ref Range    TSH 1.10 0.40 - 4.00 mU/L   Vitamin D Deficiency     Status: Normal   Result Value Ref Range    Vitamin D, Total (25-Hydroxy) 23 20 - 75 ug/L    Narrative    Season, race, dietary intake, and treatment affect the concentration of 25-hydroxy-Vitamin D. Values may decrease during winter months and increase during summer months. Values 20-29 ug/L may indicate Vitamin D insufficiency and values <20 ug/L may indicate Vitamin D deficiency.    Vitamin D determination is routinely performed by an immunoassay specific for 25 hydroxyvitamin D3.  If an individual is on vitamin D2(ergocalciferol) supplementation, please specify 25 OH vitamin D2 and D3 level determination by LCMSMS test [id number].                      Discharge Medications:     Current Discharge Medication List      START taking these medications    Details   naltrexone (DEPADE/REVIA) 50 MG tablet Take 1 tablet (50 mg) by mouth daily  Qty: 30 tablet, Refills: 0    Associated Diagnoses: Alcohol use disorder, moderate, in controlled environment (H)      nicotine  "(NICORETTE) 2 MG gum Place 1-2 each (2-4 mg) inside cheek every hour as needed for other (nicotine withdrawal symptoms)  Qty: 220 each, Refills: 0    Associated Diagnoses: Alcohol use disorder, moderate, in controlled environment (H)                    Psychiatric Examination:       MSE/PSYCH  PSYCHIATRIC EXAM  /68   Pulse 72   Temp 97.5  F (36.4  C) (Tympanic)   Resp 14   Ht 1.854 m (6' 1\")   Wt 75.4 kg (166 lb 3.2 oz)   SpO2 97%   BMI 21.93 kg/m    -Appearance/Behavior: normal and improved  -Motor: intact  -Gait: intact  -Abnormal involuntary movements: none  -Mood: reactive and calm  -Affect: brighter  -Speech: regular      -Thought process/associations: Logical and Goal directed.  -Thought content: no delusions or hallucinations  -Perceptual disturbances: No hallucinations..              -Suicidal/Homicidal Ideation: denies any   -Judgment: Good.  -Insight: Good.  *Orientation: time, place and person.  *Memory: intact  *Attention: intact  *Language: fluent, no aphasias, able to repeat phrases and name objects. Vocab intact.  *Fund of information: appropriate for education  *Cognitive functioning estimate: 0 - independent.           Discharge Plan:       Discharging home with grandmother. She will bring him to his rule 25.             Attestation:  The patient has been seen and evaluated by me,  Akila Olivo NP         Discharge Services Provided:    45   minutes spent on discharge services, including:  Final examination of patient.  Review and discussion of Hospital stay.  Instructions for continued outpatient care/goals.  Preparation of discharge records.  Preparation of medications refills and new prescriptions.  Preparation of Applicable referral forms.   "